# Patient Record
Sex: MALE
[De-identification: names, ages, dates, MRNs, and addresses within clinical notes are randomized per-mention and may not be internally consistent; named-entity substitution may affect disease eponyms.]

---

## 2019-01-15 ENCOUNTER — HOSPITAL ENCOUNTER (EMERGENCY)
Dept: HOSPITAL 97 - ER | Age: 19
Discharge: HOME | End: 2019-01-15
Payer: COMMERCIAL

## 2019-01-15 VITALS — TEMPERATURE: 98.6 F

## 2019-01-15 VITALS — DIASTOLIC BLOOD PRESSURE: 78 MMHG | OXYGEN SATURATION: 100 % | SYSTOLIC BLOOD PRESSURE: 132 MMHG
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
## 2019-01-15 DIAGNOSIS — S93.601A: Primary | ICD-10-CM
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
## 2019-01-15 PROCEDURE — 99284 EMERGENCY DEPT VISIT MOD MDM: CPT

## 2019-01-15 NOTE — EDPHYS
Physician Documentation                                                                           

 [facility]                                                                

Name: Dago Carrera Jr                                                                              

Age: 18 yrs                                                                                       

Sex: Male                                                                                         

: 2000                                                                                   

MRN: [id number]                                                                                   

Arrival Date: 01/15/2019                                                                          

Time: 22:16                                                                                       

Account#: [id number]                                                                            

Bed 23                                                                                            

Private MD: Darnell Marie W                                                                

ED Physician Trae Montero                                                                      

HPI:                                                                                              

01/15                                                                                             

22:40 This 18 yrs old  Male presents to ER via Ambulatory with complaints of Foot     emory 

      Injury.                                                                                     

22:40 The patient presents with decreased range of motion, pain, swelling, tenderness. The    emory 

      complaints affect the right foot. Context: resulted from the patient falling, while         

      jumping. Onset: The symptoms/episode began/occurred today. Modifying factors: The           

      symptoms are alleviated by elevation of extremity, the symptoms are aggravated by           

      weight bearing, movement. Associated signs and symptoms: The patient has no apparent        

      associated signs or symptoms. Severity of symptoms: At their worst the symptoms were        

      moderate, in the emergency department the symptoms are unchanged.                           

                                                                                                  

Historical:                                                                                       

- Allergies:                                                                                      

22:22 PENICILLINS;                                                                            tl3 

22:22 Benadryl;                                                                               tl3 

- PSHx:                                                                                           

22:22 left foot; right hand;                                                                  tl3 

                                                                                                  

- Immunization history:: Adult Immunizations up to date.                                          

- Social history:: Smoking status: Patient/guardian denies using tobacco, never smoked.           

- Ebola Screening: : No symptoms or risks identified at this time.                                

- Family history:: not pertinent.                                                                 

- Hospitalizations: : No recent hospitalization is reported.                                      

                                                                                                  

                                                                                                  

ROS:                                                                                              

22:40 Constitutional: Negative for fever, chills, and weight loss, Eyes: Negative for injury, emory 

      pain, redness, and discharge, ENT: Negative for injury, pain, and discharge, Neck:          

      Negative for injury, pain, and swelling, Cardiovascular: Negative for chest pain,           

      palpitations, and edema, Respiratory: Negative for shortness of breath, cough,              

      wheezing, and pleuritic chest pain, Abdomen/GI: Negative for abdominal pain, nausea,        

      vomiting, diarrhea, and constipation, Back: Negative for injury and pain, : Negative      

      for injury, bleeding, discharge, and swelling, Skin: Negative for injury, rash, and         

      discoloration, Neuro: Negative for headache, weakness, numbness, tingling, and seizure,     

      Psych: Negative for depression, anxiety, suicide ideation, homicidal ideation, and          

      hallucinations, Allergy/Immunology: Negative for hives, rash, and allergies, Endocrine:     

      Negative for neck swelling, polydipsia, polyuria, polyphagia, and marked weight             

      changes, Hematologic/Lymphatic: Negative for swollen nodes, abnormal bleeding, and          

      unusual bruising.                                                                           

22:40 MS/extremity: Positive for decreased range of motion, pain, tenderness, of the dorsum       

      of right foot.                                                                              

                                                                                                  

Exam:                                                                                             

22:40 Constitutional:  This is a well developed, well nourished patient who is awake, alert,  emory 

      and in no acute distress. Head/Face:  Normocephalic, atraumatic. Eyes:  Pupils equal        

      round and reactive to light, extra-ocular motions intact.  Lids and lashes normal.          

      Conjunctiva and sclera are non-icteric and not injected.  Cornea within normal limits.      

      Periorbital areas with no swelling, redness, or edema. ENT:  Nares patent. No nasal         

      discharge, no septal abnormalities noted.  Tympanic membranes are normal and external       

      auditory canals are clear.  Oropharynx with no redness, swelling, or masses, exudates,      

      or evidence of obstruction, uvula midline.  Mucous membranes moist. Neck:  Trachea          

      midline, no thyromegaly or masses palpated, and no cervical lymphadenopathy.  Supple,       

      full range of motion without nuchal rigidity, or vertebral point tenderness.  No            

      Meningismus. Chest/axilla:  Normal chest wall appearance and motion.  Nontender with no     

      deformity.  No lesions are appreciated. Cardiovascular:  Regular rate and rhythm with a     

      normal S1 and S2.  No gallops, murmurs, or rubs.  Normal PMI, no JVD.  No pulse             

      deficits. Respiratory:  Lungs have equal breath sounds bilaterally, clear to                

      auscultation and percussion.  No rales, rhonchi or wheezes noted.  No increased work of     

      breathing, no retractions or nasal flaring. Abdomen/GI:  Soft, non-tender, with normal      

      bowel sounds.  No distension or tympany.  No guarding or rebound.  No evidence of           

      tenderness throughout. Back:  No spinal tenderness.  No costovertebral tenderness.          

      Full range of motion. Male :  Normal genitalia with no discharge or lesions. Skin:        

      Warm, dry with normal turgor.  Normal color with no rashes, no lesions, and no evidence     

      of cellulitis. Neuro:  Awake and alert, GCS 15, oriented to person, place, time, and        

      situation.  Cranial nerves II-XII grossly intact.  Motor strength 5/5 in all                

      extremities.  Sensory grossly intact.  Cerebellar exam normal.  Normal gait. Psych:         

      Awake, alert, with orientation to person, place and time.  Behavior, mood, and affect       

      are within normal limits.                                                                   

22:40 Musculoskeletal/extremity: ROM: full passive range of motion, limited active range of       

      motion, Circulation is intact in all extremities. Sensation intact. DVT Exam: no            

      swelling, negative Homans' sign noted on exam, no appreciated bluish discoloration, no      

      erythema, no increased warmth, pain, tenderness.                                            

                                                                                                  

Vital Signs:                                                                                      

22:22  / 81; Pulse 71; Resp 18; Temp 98.6; Pulse Ox 99% ; Weight 60.78 kg; Height 5 ft. tl3 

      5 in. (165.10 cm);                                                                          

23:38  / 78; Pulse 80; Resp 18; Pulse Ox 100% on R/A; Pain 0/10;                        mg2 

22:22 Body Mass Index 22.30 (60.78 kg, 165.10 cm)                                             tl3 

                                                                                                  

MDM:                                                                                              

22:24 Patient medically screened.                                                             [facility] 

22:42 Data reviewed: radiologic studies, plain films.                                         [facility] 

                                                                                                  

01/15                                                                                             

22:39 Order name: Foot Right 3 View XRAY                                                      [facility] 

01/15                                                                                             

22:39 Order name: Ice pack; Complete Time: 22:42                                              [facility] 

01/15                                                                                             

23:16 Order name: Post-op shoe; Complete Time: 23:25                                          [facility] 

                                                                                                  

Administered Medications:                                                                         

22:46 Drug: Motrin 600 mg Route: PO;                                                          mg2 

23:17 Follow up: Response: No adverse reaction; Pain is decreased                             mg2 

22:46 Drug: Norco 5 mg-325 mg 1 tabs Route: PO;                                               mg2 

23:17 Follow up: Response: No adverse reaction; Pain is decreased                             mg2 

                                                                                                  

                                                                                                  

Disposition:                                                                                      

01/15/19 23:17 Discharged to Home. Impression: Sprain of foot.                                    

- Condition is Stable.                                                                            

                                                                                                  

- Prescriptions for Tylenol- Codeine #3 300-30 mg Oral Tablet - take 1 tablet by ORAL             

  route every 4 hours As needed; 24 tablet. Motrin  mg Oral Tablet - take 1                 

  tablet by ORAL route every 6 hours As needed as needed with food; 40 tablet.                    

- Medication Reconciliation Form, Thank You Letter, Antibiotic Education, Prescription            

  Opioid Use, Work release form form.                                                             

- Follow up: Darnell Marie MD; When: 2 - 3 days; Reason: Recheck today's                   

  complaints, Continuance of care, Re-evaluation by your physician. Follow up:                    

  Alejandro Stratton MD; When: 2 - 3 days; Reason: Recheck today's complaints,                      

  Re-evaluation by your physician.                                                                

- Problem is new.                                                                                 

- Symptoms have improved.                                                                         

                                                                                                  

                                                                                                  

                                                                                                  

Signatures:                                                                                       

Dispatcher [employer]                           Trae Quezada MD MD cha Lowrey, Tammy, RN                       RN   tl3                                                  

Raheem Swann RN                    RN   mg2                                                  

                                                                                                  

Corrections: (The following items were deleted from the chart)                                    

23:40 23:17 01/15/2019 23:17 Discharged to Home. Impression: Sprain of foot. Condition is     mg2 

      Stable. Forms are Medication Reconciliation Form, Thank You Letter, Antibiotic              

      Education, Prescription Opioid Use. Follow up: Darnell Marie; When: 2 - 3 days;        

      Reason: Recheck today's complaints, Continuance of care, Re-evaluation by your              

      physician. Follow up: Alejandro Stratton; When: 2 - 3 days; Reason: Recheck today's             

      complaints, Re-evaluation by your physician. Problem is new. Symptoms have improved. emory    

                                                                                                  

**************************************************************************************************

## 2019-01-15 NOTE — ER
Nurse's Notes                                                                                     

 [facility]                                                                

Name: Dago Carrera Jr                                                                              

Age: 18 yrs                                                                                       

Sex: Male                                                                                         

: 2000                                                                                   

MRN: [id number]                                                                                   

Arrival Date: 01/15/2019                                                                          

Time: 22:16                                                                                       

Account#: [id number]                                                                            

Bed 23                                                                                            

Private MD: Darnell Marie W                                                                

Diagnosis: Sprain of foot                                                                         

                                                                                                  

Presentation:                                                                                     

01/15                                                                                             

22:19 Presenting complaint: Patient states: pt was getting out of truck at work this          tl3 

      afternoon when he rolled his right foot, complains of pain to lateral side of foot.         

      Transition of care: patient was not received from another setting of care. Onset of         

      symptoms was January 15, 2019. Risk Assessment: Do you want to hurt yourself or someone     

      else? Patient reports no desire to harm self or others. Initial Sepsis Screen: Does the     

      patient meet any 2 criteria? No. Patient's initial sepsis screen is negative. Does the      

      patient have a suspected source of infection? No. Patient's initial sepsis screen is        

      negative. Care prior to arrival: Medication(s) given: Motrin, 200 mg.                       

22:19 Method Of Arrival: Ambulatory                                                           tl3 

22:19 Acuity: CHEPE 3                                                                           tl3 

                                                                                                  

Triage Assessment:                                                                                

22:22 General: Appears Behavior is calm, cooperative, appropriate for age. Pain: Complains of tl3 

      pain in right foot Pain currently is 5 out of 10 on a pain scale. Quality of pain is        

      described as.                                                                               

                                                                                                  

Historical:                                                                                       

- Allergies:                                                                                      

22:22 PENICILLINS;                                                                            tl3 

22:22 Benadryl;                                                                               tl3 

- PSHx:                                                                                           

22:22 left foot; right hand;                                                                  tl3 

                                                                                                  

- Immunization history:: Adult Immunizations up to date.                                          

- Social history:: Smoking status: Patient/guardian denies using tobacco, never smoked.           

- Ebola Screening: : No symptoms or risks identified at this time.                                

- Family history:: not pertinent.                                                                 

- Hospitalizations: : No recent hospitalization is reported.                                      

                                                                                                  

                                                                                                  

Screenin:28 Abuse screen: Denies threats or abuse. Denies injuries from another. Nutritional        mg2 

      screening: No deficits noted. Tuberculosis screening: No symptoms or risk factors           

      identified. Fall Risk Fall in past 12 months (25 points). Ambulatory Aid-                   

      Crutches/Cane/Walker (15 pts).                                                              

                                                                                                  

Assessment:                                                                                       

22:27 General: Appears in no apparent distress. comfortable, Behavior is calm, cooperative.   mg2 

      Pain: Complains of pain in right foot Pain does not radiate. Pain currently is 5 out of     

      10 on a pain scale. Quality of pain is described as aching, Pain began suddenly, Is         

      intermittent. Pain: Alleviated by medications, cold application. Neuro: Level of            

      Consciousness is awake, alert, obeys commands, Oriented to person, place, time,             

      situation. Cardiovascular: Capillary refill < 3 seconds Patient's skin is warm and dry.     

      Respiratory: Airway is patent Respiratory effort is even, unlabored, Respiratory            

      pattern is regular, symmetrical. GI: No signs and/or symptoms were reported involving       

      the gastrointestinal system. : No signs and/or symptoms were reported regarding the       

      genitourinary system. EENT: No signs and/or symptoms were reported regarding the EENT       

      system. Derm: Skin is intact, is healthy with good turgor, Skin is pink, warm \T\ dry.      

      normal. Musculoskeletal: Circulation, motion, and sensation intact. Capillary refill <      

      3 seconds. Injury Description: pain.                                                        

23:25 Reassessment: Patient appears in no apparent distress at this time. Patient and/or      mg2 

      family updated on plan of care and expected duration. Pain level reassessed. patient's      

      pain level decreased.                                                                       

                                                                                                  

Vital Signs:                                                                                      

22:22  / 81; Pulse 71; Resp 18; Temp 98.6; Pulse Ox 99% ; Weight 60.78 kg; Height 5 ft. tl3 

      5 in. (165.10 cm);                                                                          

23:38  / 78; Pulse 80; Resp 18; Pulse Ox 100% on R/A; Pain 0/10;                        mg2 

22:22 Body Mass Index 22.30 (60.78 kg, 165.10 cm)                                             tl3 

                                                                                                  

ED Course:                                                                                        

22:16 Patient arrived in ED.                                                                  al2 

22:17 Darnell Marie MD is Private Physician.                                           al2 

22:21 Triage completed.                                                                       tl3 

22:22 Arm band placed on left wrist.                                                          tl3 

22:24 Trae Montero MD is Attending Physician.                                             emory 

22:26 Raheem Swann RN is Primary Nurse.                                                  mg2 

22:29 Patient has correct armband on for positive identification.                             mg2 

22:29 No provider procedures requiring assistance completed. Patient did not have IV access   mg2 

      during this emergency room visit.                                                           

23:16 Darnell Marie MD is Referral Physician.                                          emory 

23:17 Alejandro Stratton MD is Referral Physician.                                              emory 

23:18 Door closed. Ice pack to injury.                                                        mg2 

23:26 Ortho shoe applied to right foot.                                                       mg2 

23:38 patient has his own crutches already.                                                   mg2 

                                                                                             

04:14 Foot Right 3 View XRAY In Process Unspecified.                                          EDMS

                                                                                                  

Administered Medications:                                                                         

01/15                                                                                             

22:46 Drug: Motrin 600 mg Route: PO;                                                          mg2 

23:17 Follow up: Response: No adverse reaction; Pain is decreased                             mg2 

22:46 Drug: Norco 5 mg-325 mg 1 tabs Route: PO;                                               mg2 

23:17 Follow up: Response: No adverse reaction; Pain is decreased                             mg2 

                                                                                                  

                                                                                                  

Outcome:                                                                                          

23:17 Discharge ordered by MD.                                                                emory 

23:39 Discharged to home ambulatory, with crutches, with family.                              mg2 

23:39 Condition: stable                                                                           

23:39 Discharge instructions given to patient, family, Instructed on discharge instructions,      

      follow up and referral plans. medication usage, Demonstrated understanding of               

      instructions, follow-up care, medications, Prescriptions given X 2.                         

23:40 Patient left the ED.                                                                    mg2 

                                                                                                  

Signatures:                                                                                       

Dispatcher [employer]                           EDMS                                                 

Trae Montero MD MD cha Love, Angelica                               al2                                                  

Amparo Meza, RN                       RN   tl3                                                  

Raheem Swann, RN                    RN   mg2                                                  

                                                                                                  

Corrections: (The following items were deleted from the chart)                                    

22:29 22:28 Fall Risk Fall in past 12 months (25 points). mg2                                 mg2 

                                                                                                  

**************************************************************************************************

## 2019-01-16 NOTE — RAD REPORT
EXAM DESCRIPTION:  RAD - Foot Right 3 View - 1/15/2019 11:03 pm

 

CLINICAL HISTORY:   Right foot pain status post injury

 

FINDINGS:  No fracture or dislocation is seen